# Patient Record
Sex: MALE
[De-identification: names, ages, dates, MRNs, and addresses within clinical notes are randomized per-mention and may not be internally consistent; named-entity substitution may affect disease eponyms.]

---

## 2020-06-26 ENCOUNTER — TRANSCRIPTION ENCOUNTER (OUTPATIENT)
Age: 35
End: 2020-06-26

## 2024-02-20 PROBLEM — Z00.00 ENCOUNTER FOR PREVENTIVE HEALTH EXAMINATION: Status: ACTIVE | Noted: 2024-02-20

## 2024-02-23 ENCOUNTER — NON-APPOINTMENT (OUTPATIENT)
Age: 39
End: 2024-02-23

## 2024-02-26 ENCOUNTER — APPOINTMENT (OUTPATIENT)
Dept: UROLOGY | Facility: CLINIC | Age: 39
End: 2024-02-26
Payer: COMMERCIAL

## 2024-02-26 ENCOUNTER — NON-APPOINTMENT (OUTPATIENT)
Age: 39
End: 2024-02-26

## 2024-02-26 VITALS
HEART RATE: 73 BPM | DIASTOLIC BLOOD PRESSURE: 94 MMHG | BODY MASS INDEX: 27.49 KG/M2 | SYSTOLIC BLOOD PRESSURE: 131 MMHG | OXYGEN SATURATION: 98 % | TEMPERATURE: 97.3 F | WEIGHT: 192 LBS | HEIGHT: 70 IN

## 2024-02-26 DIAGNOSIS — R59.0 LOCALIZED ENLARGED LYMPH NODES: ICD-10-CM

## 2024-02-26 DIAGNOSIS — I10 ESSENTIAL (PRIMARY) HYPERTENSION: ICD-10-CM

## 2024-02-26 DIAGNOSIS — N32.9 BLADDER DISORDER, UNSPECIFIED: ICD-10-CM

## 2024-02-26 DIAGNOSIS — N48.9 DISORDER OF PENIS, UNSPECIFIED: ICD-10-CM

## 2024-02-26 PROCEDURE — 99204 OFFICE O/P NEW MOD 45 MIN: CPT

## 2024-02-26 NOTE — PHYSICAL EXAM
[General Appearance - Well Developed] : well developed [General Appearance - Well Nourished] : well nourished [Heart Rate And Rhythm] : heart rate and rhythm were normal [] : no respiratory distress [Abdomen Soft] : soft [Abdomen Tenderness] : non-tender [Abdomen Hernia] : no hernia was discovered [Costovertebral Angle Tenderness] : no ~M costovertebral angle tenderness [Urethral Meatus] : meatus normal [Epididymis] : the epididymides were normal [Testes Tenderness] : no tenderness of the testes [Testes Mass (___cm)] : there were no testicular masses [Normal Station and Gait] : the gait and station were normal for the patient's age [Skin Color & Pigmentation] : normal skin color and pigmentation [No Focal Deficits] : no focal deficits [Oriented To Time, Place, And Person] : oriented to person, place, and time [de-identified] : Uncircumcised phallus, resolving pimple like lesion circumferentially around the foreskin [de-identified] : palpable left inguinal lymph node mildly tender

## 2024-02-26 NOTE — ASSESSMENT
[FreeTextEntry1] : 37 yo male with likely bacterial penile infection from intercourse.  This caused reactive left lymphadenopathy.  His symptoms are resolving after start a course of cipro.  I advised him to complete his prescribed course.  He will notify me if any symptoms worsen or if any new symptoms arise.

## 2024-02-26 NOTE — HISTORY OF PRESENT ILLNESS
[FreeTextEntry1] : 37 yo male presents with 2 weeks of mild dysuria, penile lesions, and painful left inguinal lymph nodes.  He was seen at his local ED where he underwent STI testing and Ucx.  These cultures were ultimately negative.  He was empirically treated with a shot of ceftriaxone.  He was also started on a 3-week course of cipro.  He has completed 1 week of the cipro.  All his prior symptoms have been resolving.  he is monogamous with his wife.  He denies any hematuria, urgency or frequency.    He does note a hx of bladder surgery as a child (possible ureterocele?).  he has not had any  issues since he was a child.

## 2025-01-22 ENCOUNTER — EMERGENCY (EMERGENCY)
Facility: HOSPITAL | Age: 40
LOS: 1 days | Discharge: HOME | End: 2025-01-22
Attending: EMERGENCY MEDICINE | Admitting: EMERGENCY MEDICINE
Payer: COMMERCIAL

## 2025-01-22 VITALS
SYSTOLIC BLOOD PRESSURE: 143 MMHG | DIASTOLIC BLOOD PRESSURE: 105 MMHG | WEIGHT: 195.11 LBS | HEART RATE: 73 BPM | RESPIRATION RATE: 18 BRPM | HEIGHT: 70 IN | TEMPERATURE: 99 F | OXYGEN SATURATION: 100 %

## 2025-01-22 VITALS
RESPIRATION RATE: 18 BRPM | OXYGEN SATURATION: 100 % | DIASTOLIC BLOOD PRESSURE: 89 MMHG | TEMPERATURE: 98 F | HEART RATE: 68 BPM | SYSTOLIC BLOOD PRESSURE: 135 MMHG

## 2025-01-22 DIAGNOSIS — K64.4 RESIDUAL HEMORRHOIDAL SKIN TAGS: ICD-10-CM

## 2025-01-22 DIAGNOSIS — I10 ESSENTIAL (PRIMARY) HYPERTENSION: ICD-10-CM

## 2025-01-22 DIAGNOSIS — K64.8 OTHER HEMORRHOIDS: ICD-10-CM

## 2025-01-22 DIAGNOSIS — K62.9 DISEASE OF ANUS AND RECTUM, UNSPECIFIED: ICD-10-CM

## 2025-01-22 LAB
ALBUMIN SERPL ELPH-MCNC: 4.2 G/DL — SIGNIFICANT CHANGE UP (ref 3.3–5)
ALP SERPL-CCNC: 48 U/L — SIGNIFICANT CHANGE UP (ref 40–120)
ALT FLD-CCNC: 14 U/L — SIGNIFICANT CHANGE UP (ref 10–45)
ANION GAP SERPL CALC-SCNC: 10 MMOL/L — SIGNIFICANT CHANGE UP (ref 5–17)
AST SERPL-CCNC: 13 U/L — SIGNIFICANT CHANGE UP (ref 10–40)
BASOPHILS # BLD AUTO: 0.02 K/UL — SIGNIFICANT CHANGE UP (ref 0–0.2)
BASOPHILS NFR BLD AUTO: 0.5 % — SIGNIFICANT CHANGE UP (ref 0–2)
BILIRUB SERPL-MCNC: 0.6 MG/DL — SIGNIFICANT CHANGE UP (ref 0.2–1.2)
BLD GP AB SCN SERPL QL: NEGATIVE — SIGNIFICANT CHANGE UP
BUN SERPL-MCNC: 9 MG/DL — SIGNIFICANT CHANGE UP (ref 7–23)
CALCIUM SERPL-MCNC: 9 MG/DL — SIGNIFICANT CHANGE UP (ref 8.4–10.5)
CHLORIDE SERPL-SCNC: 103 MMOL/L — SIGNIFICANT CHANGE UP (ref 96–108)
CO2 SERPL-SCNC: 28 MMOL/L — SIGNIFICANT CHANGE UP (ref 22–31)
CREAT SERPL-MCNC: 0.84 MG/DL — SIGNIFICANT CHANGE UP (ref 0.5–1.3)
EGFR: 114 ML/MIN/1.73M2 — SIGNIFICANT CHANGE UP
EOSINOPHIL # BLD AUTO: 0.06 K/UL — SIGNIFICANT CHANGE UP (ref 0–0.5)
EOSINOPHIL NFR BLD AUTO: 1.4 % — SIGNIFICANT CHANGE UP (ref 0–6)
GLUCOSE SERPL-MCNC: 110 MG/DL — HIGH (ref 70–99)
HCT VFR BLD CALC: 42.7 % — SIGNIFICANT CHANGE UP (ref 39–50)
HGB BLD-MCNC: 13.9 G/DL — SIGNIFICANT CHANGE UP (ref 13–17)
IMM GRANULOCYTES NFR BLD AUTO: 0.5 % — SIGNIFICANT CHANGE UP (ref 0–0.9)
LACTATE SERPL-SCNC: 0.7 MMOL/L — SIGNIFICANT CHANGE UP (ref 0.5–2)
LIDOCAIN IGE QN: 32 U/L — SIGNIFICANT CHANGE UP (ref 7–60)
LYMPHOCYTES # BLD AUTO: 1.26 K/UL — SIGNIFICANT CHANGE UP (ref 1–3.3)
LYMPHOCYTES # BLD AUTO: 28.6 % — SIGNIFICANT CHANGE UP (ref 13–44)
MCHC RBC-ENTMCNC: 28.8 PG — SIGNIFICANT CHANGE UP (ref 27–34)
MCHC RBC-ENTMCNC: 32.6 G/DL — SIGNIFICANT CHANGE UP (ref 32–36)
MCV RBC AUTO: 88.4 FL — SIGNIFICANT CHANGE UP (ref 80–100)
MONOCYTES # BLD AUTO: 0.55 K/UL — SIGNIFICANT CHANGE UP (ref 0–0.9)
MONOCYTES NFR BLD AUTO: 12.5 % — SIGNIFICANT CHANGE UP (ref 2–14)
NEUTROPHILS # BLD AUTO: 2.5 K/UL — SIGNIFICANT CHANGE UP (ref 1.8–7.4)
NEUTROPHILS NFR BLD AUTO: 56.5 % — SIGNIFICANT CHANGE UP (ref 43–77)
NRBC # BLD: 0 /100 WBCS — SIGNIFICANT CHANGE UP (ref 0–0)
PLATELET # BLD AUTO: 235 K/UL — SIGNIFICANT CHANGE UP (ref 150–400)
POTASSIUM SERPL-MCNC: 4.1 MMOL/L — SIGNIFICANT CHANGE UP (ref 3.5–5.3)
POTASSIUM SERPL-SCNC: 4.1 MMOL/L — SIGNIFICANT CHANGE UP (ref 3.5–5.3)
PROT SERPL-MCNC: 6.8 G/DL — SIGNIFICANT CHANGE UP (ref 6–8.3)
RBC # BLD: 4.83 M/UL — SIGNIFICANT CHANGE UP (ref 4.2–5.8)
RBC # FLD: 12.4 % — SIGNIFICANT CHANGE UP (ref 10.3–14.5)
RH IG SCN BLD-IMP: POSITIVE — SIGNIFICANT CHANGE UP
SODIUM SERPL-SCNC: 141 MMOL/L — SIGNIFICANT CHANGE UP (ref 135–145)
TROPONIN T, HIGH SENSITIVITY RESULT: <6 NG/L — SIGNIFICANT CHANGE UP (ref 0–51)
WBC # BLD: 4.41 K/UL — SIGNIFICANT CHANGE UP (ref 3.8–10.5)
WBC # FLD AUTO: 4.41 K/UL — SIGNIFICANT CHANGE UP (ref 3.8–10.5)

## 2025-01-22 RX ORDER — PANTOPRAZOLE 40 MG/1
40 TABLET, DELAYED RELEASE ORAL ONCE
Refills: 0 | Status: COMPLETED | OUTPATIENT
Start: 2025-01-22 | End: 2025-01-22

## 2025-01-22 RX ORDER — ONDANSETRON 4 MG/1
4 TABLET ORAL ONCE
Refills: 0 | Status: COMPLETED | OUTPATIENT
Start: 2025-01-22 | End: 2025-01-22

## 2025-01-22 RX ADMIN — ONDANSETRON 4 MILLIGRAM(S): 4 TABLET ORAL at 05:33

## 2025-01-22 RX ADMIN — PANTOPRAZOLE 40 MILLIGRAM(S): 40 TABLET, DELAYED RELEASE ORAL at 05:33

## 2025-01-22 NOTE — ED ADULT NURSE NOTE - OBJECTIVE STATEMENT
Pt presents to ED c/o bright red blood in stool since monad accompanied by slight nausea and left sided abdominal discomfort/burning that is intermittent. Reports stool started as diarrhea but is now more formed. Denies vomiting, fever/chills, cp, sob, dizziness, weakness, numbness, headache

## 2025-01-22 NOTE — ED PROVIDER NOTE - PROGRESS NOTE DETAILS
Hemoglobin stable patient had no additional episodes of  bright red blood per rectum in the ED patient well-appearing young will give follow-up  with colorectal return precautions

## 2025-01-22 NOTE — ED PROVIDER NOTE - NSFOLLOWUPINSTRUCTIONS_ED_ALL_ED_FT
Please follow-up closely with colorectal surgery, perform sitz bath's twice daily eat plenty of fiber, should you develop any worsening rectal bleeding lightheadedness nausea please call [phone] and seek emergent medical attention.      Please reach out to Marah Pena ([facility] clinical referral coordinator) to assist you with your follow-up appointment.     Monday - Friday 11am-7pm  [phone]  [email or web address]      Hemorrhoids  The colon, with 3 close-ups of the rectum. One is normal and the other 2 show external and internal hemorrhoids.  Hemorrhoids are swollen veins that may form:  In the butt (rectum). These are called internal hemorrhoids.  Around the opening of the butt (anus). These are called external hemorrhoids.  Most hemorrhoids do not cause very bad problems. They often get better with changes to your lifestyle and what you eat.    What are the causes?  Having trouble pooping (constipation) or watery poop (diarrhea).  Pushing too hard when you poop.  Pregnancy.  Being very overweight (obese).  Sitting for too long.  Riding a bike for a long time.  Heavy lifting or other things that take a lot of effort.  Anal sex.  What are the signs or symptoms?  Pain.  Itching or soreness in the butt.  Bleeding from the butt.  Leaking poop.  Swelling.  One or more lumps around the opening of your butt.  How is this treated?  In most cases, hemorrhoids can be treated at home. You may be told to:  Change what you eat.  Make changes to your lifestyle.  If these treatments do not help, you may need to have a procedure done. Your doctor may need to:  Place rubber bands at the bottom of the hemorrhoids to make them fall off.  Put medicine into the hemorrhoids to shrink them.  Shine a type of light on the hemorrhoids to cause them to fall off.  Do surgery to get rid of the hemorrhoids.  Follow these instructions at home:  Medicines    Take over-the-counter and prescription medicines only as told by your doctor.  Use creams with medicine in them or medicines that you put in your butt as told by your doctor.  Eating and drinking    Examples of vegetables and other plant-based foods.  Eat foods that have a lot of fiber in them. These include whole grains, beans, nuts, fruits, and vegetables.  Ask your doctor about taking products that have fiber added to them (fibersupplements).  Take in less fat. You can do this by:  Eating low-fat dairy products.  Eating less red meat.  Staying away from processed foods.  Drink enough fluid to keep your pee (urine) pale yellow.  Managing pain and swelling    A bathtub partially filled with water.  Take a warm-water bath (sitz bath) for 20 minutes to ease pain. Do this 3–4 times a day. You may do this in a bathtub. You may also use a portable sitz bath that fits over the toilet.  If told, put ice on the painful area. It may help to use ice between your warm baths.  Put ice in a plastic bag.  Place a towel between your skin and the bag.  Leave the ice on for 20 minutes, 2–3 times a day.  If your skin turns bright red, take off the ice right away to prevent skin damage. The risk of damage is higher if you cannot feel pain, heat, or cold.  General instructions    Exercise. Ask your doctor how much and what kind of exercise is best for you.  Go to the bathroom when you need to poop. Do not wait.  Try not to push too hard when you poop.  Keep your butt dry and clean. Use wet toilet paper or moist towelettes after you poop.  Do not sit on the toilet for a long time.  Contact a doctor if:  You have pain and swelling that do not get better with treatment.  You have trouble pooping.  You cannot poop.  You have pain or swelling outside the area of the hemorrhoids.  Get help right away if:  You have bleeding from the butt that will not stop.  This information is not intended to replace advice given to you by your health care provider. Make sure you discuss any questions you have with your health care provider.

## 2025-01-22 NOTE — ED PROVIDER NOTE - OBJECTIVE STATEMENT
39-year-old male with history of hypertension here today for bright red blood per rectum intermittently for the past 1 week associated with loose stools and soft stools no rectal pain or rectal trauma.  Notices blood in toilet bowl and after wiping.  Has a history of hemorrhoids.  Not taking anticoagulation.  No aspirin.  No abdominal pain.  No nausea vomiting.  No fever chills cough or urinary complaints.

## 2025-01-22 NOTE — ED PROVIDER NOTE - PATIENT PORTAL LINK FT
You can access the FollowMyHealth Patient Portal offered by [facility] [place] by registering at the following website: [email or web address]. By joining [facility]’s FollowMyHealth portal, you will also be able to view your health information using other applications (apps) compatible with our system.

## 2025-01-22 NOTE — ED PROVIDER NOTE - CARE PROVIDER_API CALL
Fran Cordero  Colon/Rectal Surgery  [facility]  [place] [address]  Phone: [phone]  Fax: [phone]  Follow Up Time:

## 2025-01-22 NOTE — ED PROVIDER NOTE - PHYSICAL EXAMINATION
VITAL SIGNS: I have reviewed nursing notes and confirm.  CONSTITUTIONAL: Well appearing, in no acute distress.   SKIN:  warm and dry, no acute rash.   HEAD:  normocephalic, atraumatic.  EYES: EOM intact; conjunctiva and sclera clear.  ENT: No nasal discharge; airway clear.   NECK: Supple.  CARD: S1, S2, Regular rate and rhythm.   RESP:  Clear to auscultation b/l, no wheezes, rales or rhonchi.  ABD: Normal bowel sounds; soft; non-distended; non-tender; no guarding/ rebound.  Rectal exam:  External hemorrhoids at 3 o'clock position not thrombosed as well as internal hemorrhoids in the midline no active bleeding no dark black stool stool is soft and light brown  EXT: Normal ROM. No peripheral edema. Pulses intact and equal b/l.  NEURO: Alert, oriented, grossly unremarkable  PSYCH: Cooperative, mood and affect appropriate.

## 2025-01-22 NOTE — ED PROVIDER NOTE - CLINICAL SUMMARY MEDICAL DECISION MAKING FREE TEXT BOX
39-year-old male with hemorrhoids and rectal bleeding likely bleeding from hemorrhoids no active bleeding here in the ED will assess hemoglobin give close follow-up and return precautions will require colorectal surgery follow-up as outpatient is otherwise hemodynamically stable     EKG normal sinus rhythm at 60 normal axis intervals no STEMI   lactate is negative

## 2025-01-22 NOTE — ED ADULT NURSE NOTE - NSFALLUNIVINTERV_ED_ALL_ED
Bed/Stretcher in lowest position, wheels locked, appropriate side rails in place/Call bell, personal items and telephone in reach/Instruct patient to call for assistance before getting out of bed/chair/stretcher/Non-slip footwear applied when patient is off stretcher/[place] to call system/Physically safe environment - no spills, clutter or unnecessary equipment/Purposeful proactive rounding/Room/bathroom lighting operational, light cord in reach mother

## 2025-01-22 NOTE — ED PROVIDER NOTE - CARE PLAN
1 Principal Discharge DX:	External hemorrhoids  Secondary Diagnosis:	Hemorrhoids  Secondary Diagnosis:	Internal hemorrhoids

## 2025-01-30 ENCOUNTER — NON-APPOINTMENT (OUTPATIENT)
Age: 40
End: 2025-01-30

## 2025-01-30 ENCOUNTER — APPOINTMENT (OUTPATIENT)
Dept: COLORECTAL SURGERY | Facility: CLINIC | Age: 40
End: 2025-01-30
Payer: COMMERCIAL

## 2025-01-30 VITALS
TEMPERATURE: 98.6 F | WEIGHT: 192 LBS | SYSTOLIC BLOOD PRESSURE: 133 MMHG | HEART RATE: 83 BPM | BODY MASS INDEX: 27.49 KG/M2 | HEIGHT: 70 IN | OXYGEN SATURATION: 98 % | DIASTOLIC BLOOD PRESSURE: 83 MMHG

## 2025-01-30 DIAGNOSIS — Z82.49 FAMILY HISTORY OF ISCHEMIC HEART DISEASE AND OTHER DISEASES OF THE CIRCULATORY SYSTEM: ICD-10-CM

## 2025-01-30 DIAGNOSIS — K92.1 MELENA: ICD-10-CM

## 2025-01-30 PROCEDURE — 46600 DIAGNOSTIC ANOSCOPY SPX: CPT

## 2025-01-30 PROCEDURE — 99203 OFFICE O/P NEW LOW 30 MIN: CPT | Mod: 25

## 2025-01-30 RX ORDER — LOSARTAN POTASSIUM AND HYDROCHLOROTHIAZIDE 12.5; 1 MG/1; MG/1
TABLET ORAL
Refills: 0 | Status: ACTIVE | COMMUNITY

## 2025-01-30 RX ORDER — AMLODIPINE BESYLATE 5 MG/1
5 TABLET ORAL
Refills: 0 | Status: ACTIVE | COMMUNITY

## 2025-03-06 ENCOUNTER — APPOINTMENT (OUTPATIENT)
Dept: COLORECTAL SURGERY | Facility: CLINIC | Age: 40
End: 2025-03-06
Payer: COMMERCIAL

## 2025-03-06 VITALS
BODY MASS INDEX: 27.49 KG/M2 | OXYGEN SATURATION: 97 % | WEIGHT: 192 LBS | SYSTOLIC BLOOD PRESSURE: 134 MMHG | HEART RATE: 88 BPM | HEIGHT: 70 IN | DIASTOLIC BLOOD PRESSURE: 80 MMHG

## 2025-03-06 DIAGNOSIS — K92.1 MELENA: ICD-10-CM

## 2025-03-06 PROCEDURE — 46221 LIGATION OF HEMORRHOID(S): CPT

## 2025-04-08 ENCOUNTER — APPOINTMENT (OUTPATIENT)
Dept: COLORECTAL SURGERY | Facility: HOSPITAL | Age: 40
End: 2025-04-08

## 2025-04-08 ENCOUNTER — OUTPATIENT (OUTPATIENT)
Dept: OUTPATIENT SERVICES | Facility: HOSPITAL | Age: 40
LOS: 1 days | Discharge: HOME | End: 2025-04-08
Payer: COMMERCIAL

## 2025-04-08 VITALS — WEIGHT: 195.11 LBS | HEIGHT: 70 IN

## 2025-04-08 PROCEDURE — 45378 DIAGNOSTIC COLONOSCOPY: CPT

## 2025-05-01 ENCOUNTER — APPOINTMENT (OUTPATIENT)
Dept: COLORECTAL SURGERY | Facility: CLINIC | Age: 40
End: 2025-05-01

## 2025-06-18 ENCOUNTER — APPOINTMENT (OUTPATIENT)
Dept: COLORECTAL SURGERY | Facility: CLINIC | Age: 40
End: 2025-06-18
Payer: COMMERCIAL

## 2025-06-18 ENCOUNTER — NON-APPOINTMENT (OUTPATIENT)
Age: 40
End: 2025-06-18

## 2025-06-18 VITALS
HEART RATE: 71 BPM | HEIGHT: 70 IN | DIASTOLIC BLOOD PRESSURE: 83 MMHG | SYSTOLIC BLOOD PRESSURE: 132 MMHG | WEIGHT: 195 LBS | BODY MASS INDEX: 27.92 KG/M2 | TEMPERATURE: 98.1 F

## 2025-06-18 PROBLEM — K64.8 INTERNAL HEMORRHOIDS: Status: ACTIVE | Noted: 2025-06-18

## 2025-06-18 PROCEDURE — 46221 LIGATION OF HEMORRHOID(S): CPT

## 2025-07-31 ENCOUNTER — APPOINTMENT (OUTPATIENT)
Dept: COLORECTAL SURGERY | Facility: CLINIC | Age: 40
End: 2025-07-31